# Patient Record
Sex: MALE | Race: ASIAN | NOT HISPANIC OR LATINO | Employment: FULL TIME | URBAN - METROPOLITAN AREA
[De-identification: names, ages, dates, MRNs, and addresses within clinical notes are randomized per-mention and may not be internally consistent; named-entity substitution may affect disease eponyms.]

---

## 2024-05-21 ENCOUNTER — OFFICE VISIT (OUTPATIENT)
Dept: ORTHOPEDICS | Facility: CLINIC | Age: 44
End: 2024-05-21
Payer: COMMERCIAL

## 2024-05-21 VITALS
DIASTOLIC BLOOD PRESSURE: 72 MMHG | HEIGHT: 66 IN | RESPIRATION RATE: 18 BRPM | WEIGHT: 200 LBS | BODY MASS INDEX: 32.14 KG/M2 | SYSTOLIC BLOOD PRESSURE: 124 MMHG

## 2024-05-21 DIAGNOSIS — M77.11 RIGHT LATERAL EPICONDYLITIS: Primary | ICD-10-CM

## 2024-05-21 PROCEDURE — 1160F RVW MEDS BY RX/DR IN RCRD: CPT | Mod: CPTII,S$GLB,, | Performed by: ORTHOPAEDIC SURGERY

## 2024-05-21 PROCEDURE — 3078F DIAST BP <80 MM HG: CPT | Mod: CPTII,S$GLB,, | Performed by: ORTHOPAEDIC SURGERY

## 2024-05-21 PROCEDURE — 3074F SYST BP LT 130 MM HG: CPT | Mod: CPTII,S$GLB,, | Performed by: ORTHOPAEDIC SURGERY

## 2024-05-21 PROCEDURE — 1159F MED LIST DOCD IN RCRD: CPT | Mod: CPTII,S$GLB,, | Performed by: ORTHOPAEDIC SURGERY

## 2024-05-21 PROCEDURE — 3008F BODY MASS INDEX DOCD: CPT | Mod: CPTII,S$GLB,, | Performed by: ORTHOPAEDIC SURGERY

## 2024-05-21 PROCEDURE — 99203 OFFICE O/P NEW LOW 30 MIN: CPT | Mod: 25,S$GLB,, | Performed by: ORTHOPAEDIC SURGERY

## 2024-05-21 PROCEDURE — 20550 NJX 1 TENDON SHEATH/LIGAMENT: CPT | Mod: RT,S$GLB,, | Performed by: ORTHOPAEDIC SURGERY

## 2024-05-21 RX ORDER — TRIAMCINOLONE ACETONIDE 40 MG/ML
40 INJECTION, SUSPENSION INTRA-ARTICULAR; INTRAMUSCULAR
Status: DISCONTINUED | OUTPATIENT
Start: 2024-05-21 | End: 2024-05-21 | Stop reason: HOSPADM

## 2024-05-21 RX ADMIN — TRIAMCINOLONE ACETONIDE 40 MG: 40 INJECTION, SUSPENSION INTRA-ARTICULAR; INTRAMUSCULAR at 08:05

## 2024-05-21 NOTE — PROGRESS NOTES
[facility]    Subjective:     Chief Complaint:   Chief Complaint   Patient presents with    Right Elbow - Pain     Right elbow pain for over one month. Notes pain with movement range of motion. Weakness and unable to lift heavier items notes pulsing pain with gripping or squeezing.       History reviewed. No pertinent past medical history.    History reviewed. No pertinent surgical history.    No current outpatient medications on file.     No current facility-administered medications for this visit.       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  He has had right elbow pain for several months he thinks it started using some sort of tool that vibrates a lot.  The pain is worse when he uses the hand.  Denies any other trauma.      Review of Systems:    Constitution: Negative for chills, fever, and sweats.  Negative for unexplained weight loss.    HENT:  Negative for headaches and blurry vision.    Cardiovascular:Negative for chest pain or irregular heart beat. Negative for hypertension.    Respiratory:  Negative for cough and shortness of breath.    Gastrointestinal: Negative for abdominal pain, heartburn, melena, nausea, and vomitting.    Genitourinary:  Negative bladder incontinence and dysuria.    Musculoskeletal:  See HPI for details.     Neurological: Negative for numbness.    Psychiatric/Behavioral: Negative for  depression.  The patient is not nervous/anxious.      Endocrine: Negative for polyuria    Hematologic/Lymphatic: Negative for bleeding problem.  Does not bruise/bleed easily.    Skin: Negative for poor would healing and rash    Objective:      Physical Examination:    Vital Signs:    Vitals:    05/21/24 0831   BP: 124/72   Resp: 18       Body mass index is 32.28 kg/m².    This a well-developed, well nourished patient in no acute distress.  They are alert and oriented and cooperative to examination.        Patient is very tender over the lateral epicondylar region pain with extension of the wrist and fingers no pain with flexion of the wrist and fingers no pain with flexion of the contralateral elbow no pain with extension of the contralateral elbow  Pertinent New Results:    XRAY Report / Interpretation:   AP and lateral of the right elbow demonstrates normal bony anatomy    Assessment/Plan:      Lateral epicondylitis I injected the lateral epicondylar region with steroid and local.  I started him on physical therapy follow-up in 6 weeks      This note was created using Dragon voice recognition software that occasionally misinterpreted phrases or words.

## 2024-05-22 PROBLEM — M25.521 RIGHT ELBOW PAIN: Status: ACTIVE | Noted: 2024-05-22

## 2024-05-22 NOTE — PROGRESS NOTES
"Patient evaluated and plan of care established.  Please sign and return if in agreement.    Thank you,  OSWALDO Bliss         Occupational Therapy [facility]  Initial Evaluation     Date: 5/23/2024  Name: Mamadou Weiss  [facility] Number: [id number]    Therapy Diagnosis: M77.11 (ICD-10-CM) - Right lateral epicondylitis   Encounter Diagnosis   Name Primary?    Right elbow pain Yes     Physician: Luis Enrique Huston MD    Physician Orders: M77.11 (ICD-10-CM) - Right lateral epicondylitis ; evaluate and treat  Surgical Procedure and Date: Not Applicable   Dominant Side: Right  Date of Onset: several months  History / Mechanism of Injury: Patient went to doctor with (Right) elbow pain on 05/21/2024.  Patient received an injection in (Right) elbow and was referred to therapy for evaluation and treatment.   Previous Therapy:  none    Environmental Concerns/Fall Risk: None  Language: None  Cultural/Spiritual: None  Abuse/Neglect/Nutritional: None  Imaging / Tests: See Epic    Past Medical History/Physical Systems Review:    has no past medical history on file.     has no past surgical history on file.    currently has no medications in their medication list.    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 Reports, "no known Cancer, no pacemaker, no allergies to latex or adhesives."      Subjective     Patient Reports, "I went to the doctor on 05/21/2024 with (Right) elbow pain.  He injected my elbow and  " "referred me to therapy for evaluation and treatment.  I have been having pain for several months and I think I hurt my elbow when I was using a tool with a lot of vibration. I got an injection when I went to the doctor and it did not really help with the pain.   I am having difficulty carrying, lifting items, I usually workout and do pushups but haven't been working out."     Pain   At rest: 0/10  With work/ Activity: 7/10  Sleepin/10  Location of Pain: (Right) elbow    Patient's Goals for Therapy: decrease pain    Objective     Sensation: grossly intact  Scar / Wound: Not Applicable     Active Range of Motion: hand  Patient able to actively make composite fist (Bilateral) fists and oppose (Bilateral) thumbs to palm     Active Range of Motion: elbow/wrist                         (Right)   //  (Left)  Elbow Extension/Flexion: 0 / 127  /// 0/130  Dorsal Flexion /Volar Flexion: 65 / 75  //  65/65   Radial Deviation /Ulnar Deviation: 20 / 30  // 20/35  Supination / Pronation: 85 / 85  // 85/85    Passive Range of Motion: elbow/wrist                         (Right)     Elbow Extension/Flexion:  WFL   Dorsal Flexion /Volar Flexion: WFL   Radial Deviation /Ulnar Deviation:  WFL   Supination / Pronation:  WFL       Manual Muscle Test:  Elbow / Wrist          (Right)       Elbow Flexion:      4/5  Elbow Extension:  4/5  Dorsal Flexion: 3+/5 p!  Volar Flexion: 4/5  Radial Deviation: 5/5  Ulnar Deviation: 5/5       Strength: (KARLIE Dynamometer in pounds),Position II  Elbow Flexion:  (Right):  60  (Left): 110    Elbow Extension:  (Right):  25  (Left): 105       Pinch Strength: (Pinch Gauge in pounds)             (Right) /  (Left)  Lateral Pinch:  17  24  3 Point Pinch: 14  20  2 Point Pinch:  / 19        FOTO SCORE: 50%      Treatment Included:   Occupational Therapy  evaluation and instruction in written Home Exercise Program including  Elbow Extension and Dorsal Flexion / Volar Flexion stretches x 5 second holds x " 5 repetitions x 3 x daily.    Patient. Educated on modalities for home pain management, activity modifications and precautions, Circumferential elbow strap for use during activity with take press off of extensor tendon;  Multidirectional soft tissue massage to lateral aspect of (Right) elbow to increase blood flow for healing.   Patient. Demo understanding of above.     Patient/Family Education: role of Occupational Therapy, goals for Occupational Therapy, scheduling/cancellations - patient verbalized understanding. Discussed insurance limitations with patient.        Assessment:     Problem List :       Decreased  and pinch strength,   Decreased muscle strength,   Decreased functional abilities,  Increased pain    During the evaluation, the patient required Minimal modification or assistance.  The following co-morbid conditions/personal factors may affect the plan of care: none.        Mamadou Weiss is a 43 y.o. male referred to outpatient occupational therapy and presents with a medical diagnosis of M77.11 (ICD-10-CM) - Right lateral epicondylitis , resulting in limited range of motion, strength, functional abilities, increased pain and inflammation and demonstrates limitations as described in the chart above. Following medical record review it is determined that patient will benefit from occupational therapy services in order to maximize pain free and/or functional use of right elbow. The following goals were discussed with the patient and patient is in agreement with them as to be addressed in the treatment plan. The patient's rehab potential is Good.     Anticipated Barriers to Therapy: None     The following goals were discussed with the patient and patient is in agreement with them as to be addressed in the treatment plan.     Goals:   Goals:  1)   Patient to be Independent with Home exercise program and modalities for pain management by 1 week.   2)   Patient will report   5/10 pain on average with  activity to assist with exercises by 6-8 weeks.  3)   Patient will increase manual muscle testing by a grade to assist with lifting items by 6-8 weeks.   4)   Patient will increase  strength 3-5 pounds to open containers by 6-8 weeks.  5)   Patient will increase pinch by 1-3 pounds for buttoning by 6-8 weeks.        Plan:   Patient to be treated by Occupational Therapy    2    times per week for     90 days   during the certification period from   05/23/2024  to 08/23/2024     to achieve the established goals.  Treatment to include :  paraffin, fluidotherapy, manual therapy/joint mobilizations, kinesiotaping, dry needling performed by certified physical therapist,   modalities for pain management, Ultrasound 1.0 /3.0mhz, therapeutic exercises/activities,        strengthening,    as well as any other treatments deemed necessary based on the patient's needs or progress.     Will reassess as needed and adjust treatment plan accordingly.              I certify the need for these services furnished under this plan of treatment and while under my care    ____________________________________                         __________________  Physician/Referring Practitioner                                               Date of Signature    Rachel Wyman OT

## 2024-05-23 ENCOUNTER — CLINICAL SUPPORT (OUTPATIENT)
Dept: REHABILITATION | Facility: HOSPITAL | Age: 44
End: 2024-05-23
Payer: COMMERCIAL

## 2024-05-23 DIAGNOSIS — M77.11 RIGHT LATERAL EPICONDYLITIS: ICD-10-CM

## 2024-05-23 DIAGNOSIS — M25.521 RIGHT ELBOW PAIN: Primary | ICD-10-CM

## 2024-05-23 PROCEDURE — 97530 THERAPEUTIC ACTIVITIES: CPT | Mod: PN

## 2024-05-23 PROCEDURE — 97165 OT EVAL LOW COMPLEX 30 MIN: CPT | Mod: PN

## 2024-05-23 NOTE — PLAN OF CARE
"Patient evaluated and plan of care established.  Please sign and return if in agreement.    Thank you,  OSWALDO Bliss         Occupational Therapy [facility]  Initial Evaluation     Date: 5/23/2024  Name: Mamadou Weiss  [facility] Number: [id number]    Therapy Diagnosis: M77.11 (ICD-10-CM) - Right lateral epicondylitis   Encounter Diagnosis   Name Primary?    Right elbow pain Yes     Physician: Luis Enrique Huston MD    Physician Orders: M77.11 (ICD-10-CM) - Right lateral epicondylitis ; evaluate and treat  Surgical Procedure and Date: Not Applicable   Dominant Side: Right  Date of Onset: several months  History / Mechanism of Injury: Patient went to doctor with (Right) elbow pain on 05/21/2024.  Patient received an injection in (Right) elbow and was referred to therapy for evaluation and treatment.   Previous Therapy:  none    Environmental Concerns/Fall Risk: None  Language: None  Cultural/Spiritual: None  Abuse/Neglect/Nutritional: None  Imaging / Tests: See Epic    Past Medical History/Physical Systems Review:    has no past medical history on file.     has no past surgical history on file.    currently has no medications in their medication list.    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 Reports, "no known Cancer, no pacemaker, no allergies to latex or adhesives."      Subjective     Patient Reports, "I went to the doctor on 05/21/2024 with (Right) elbow pain.  He injected my elbow and  " "referred me to therapy for evaluation and treatment.  I have been having pain for several months and I think I hurt my elbow when I was using a tool with a lot of vibration. I got an injection when I went to the doctor and it did not really help with the pain.   I am having difficulty carrying, lifting items, I usually workout and do pushups but haven't been working out."     Pain   At rest: 0/10  With work/ Activity: 7/10  Sleepin/10  Location of Pain: (Right) elbow    Patient's Goals for Therapy: decrease pain    Objective     Sensation: grossly intact  Scar / Wound: Not Applicable     Active Range of Motion: hand  Patient able to actively make composite fist (Bilateral) fists and oppose (Bilateral) thumbs to palm     Active Range of Motion: elbow/wrist                         (Right)   //  (Left)  Elbow Extension/Flexion: 0 / 127  /// 0/130  Dorsal Flexion /Volar Flexion: 65 / 75  //  65/65   Radial Deviation /Ulnar Deviation: 20 / 30  // 20/35  Supination / Pronation: 85 / 85  // 85/85    Passive Range of Motion: elbow/wrist                         (Right)     Elbow Extension/Flexion:  WFL   Dorsal Flexion /Volar Flexion: WFL   Radial Deviation /Ulnar Deviation:  WFL   Supination / Pronation:  WFL       Manual Muscle Test:  Elbow / Wrist          (Right)       Elbow Flexion:      4/5  Elbow Extension:  4/5  Dorsal Flexion: 3+/5 p!  Volar Flexion: 4/5  Radial Deviation: 5/5  Ulnar Deviation: 5/5       Strength: (KARLIE Dynamometer in pounds),Position II  Elbow Flexion:  (Right):  60  (Left): 110    Elbow Extension:  (Right):  25  (Left): 105       Pinch Strength: (Pinch Gauge in pounds)             (Right) /  (Left)  Lateral Pinch:  17  24  3 Point Pinch: 14  20  2 Point Pinch:  / 19        FOTO SCORE: 50%      Treatment Included:   Occupational Therapy  evaluation and instruction in written Home Exercise Program including  Elbow Extension and Dorsal Flexion / Volar Flexion stretches x 5 second holds x " 5 repetitions x 3 x daily.    Patient. Educated on modalities for home pain management, activity modifications and precautions, Circumferential elbow strap for use during activity with take press off of extensor tendon;  Multidirectional soft tissue massage to lateral aspect of (Right) elbow to increase blood flow for healing.   Patient. Demo understanding of above.     Patient/Family Education: role of Occupational Therapy, goals for Occupational Therapy, scheduling/cancellations - patient verbalized understanding. Discussed insurance limitations with patient.        Assessment:     Problem List :       Decreased  and pinch strength,   Decreased muscle strength,   Decreased functional abilities,  Increased pain    During the evaluation, the patient required Minimal modification or assistance.  The following co-morbid conditions/personal factors may affect the plan of care: none.        Mamadou Weiss is a 43 y.o. male referred to outpatient occupational therapy and presents with a medical diagnosis of M77.11 (ICD-10-CM) - Right lateral epicondylitis , resulting in limited range of motion, strength, functional abilities, increased pain and inflammation and demonstrates limitations as described in the chart above. Following medical record review it is determined that patient will benefit from occupational therapy services in order to maximize pain free and/or functional use of right elbow. The following goals were discussed with the patient and patient is in agreement with them as to be addressed in the treatment plan. The patient's rehab potential is Good.     Anticipated Barriers to Therapy: None     The following goals were discussed with the patient and patient is in agreement with them as to be addressed in the treatment plan.     Goals:   Goals:  1)   Patient to be Independent with Home exercise program and modalities for pain management by 1 week.   2)   Patient will report   5/10 pain on average with  activity to assist with exercises by 6-8 weeks.  3)   Patient will increase manual muscle testing by a grade to assist with lifting items by 6-8 weeks.   4)   Patient will increase  strength 3-5 pounds to open containers by 6-8 weeks.  5)   Patient will increase pinch by 1-3 pounds for buttoning by 6-8 weeks.        Plan:   Patient to be treated by Occupational Therapy    2    times per week for     90 days   during the certification period from   05/23/2024  to 08/23/2024     to achieve the established goals.  Treatment to include :  paraffin, fluidotherapy, manual therapy/joint mobilizations, kinesiotaping, dry needling performed by certified physical therapist,   modalities for pain management, Ultrasound 1.0 /3.0mhz, therapeutic exercises/activities,        strengthening,    as well as any other treatments deemed necessary based on the patient's needs or progress.     Will reassess as needed and adjust treatment plan accordingly.              I certify the need for these services furnished under this plan of treatment and while under my care    ____________________________________                         __________________  Physician/Referring Practitioner                                               Date of Signature    Rachel Wyman OT

## 2024-05-23 NOTE — PROGRESS NOTES
"                                  Occupational Therapy Daily Treatment Note       Date: 5/30/2024  Name: Mamadou Weiss  [facility] Number: [id number]    Therapy Diagnosis: M77.11 (ICD-10-CM) - Right lateral epicondylitis  Encounter Diagnosis   Name Primary?    Right elbow pain Yes     Physician: Luis Enrique Huston MD    Physician Orders: M77.11 (ICD-10-CM) - Right lateral epicondylitis; evaluate and treat  Medical Diagnosis: M77.11 (ICD-10-CM) - Right lateral epicondylitis  Surgical Procedure and Date: Not Applicable     Insurance Authorization Period Expiration: 05/01/2024-12/31/2024  Plan of Care Certification Period: 05/23/2024-08/23/2024  Date of Return to MD: 07/02/2024    Evaluation FOTO: 05/23/2024 = 50%    Visit # / Visits authorized: 1 / 20   Time In: 8:30  Time Out: 9:10   Total Billable Time:  40 minutes    Precautions:  Standard            Subjective     Patient reports: "I got the elbow strap and I think my pain in my elbow is getting better.  I still have pain when I lift up something heavy like the trash, and when I go to move something of weight."     Pain: 4-6/10   Location of pain: (Right) elbow     Objective    Patient seen by Occupational Therapy this session. Tx consisted of:      Direct contact modalities after being cleared for contraindications x    8 minutes:  -Ultrasound applied to   lateral aspect of (Right) elbow region  on   1.0 Mhz with 1.2 w/cm2 @ 50 % duty cycle to decrease pain and inflammation  /////  to and increase tissue elasticity x 8 minutes.        Therapeutic  Exercises to improve functional performance while increasing strength, endurance, range of motion,  and flexibility  x     8 minutes:    -Soft Tissue Massage to lateral aspect of (Right) elbow region with SILVER tool increase blood flow for healing and functional abilities  x  4  minutes    - Wrist wheel for Supination / Pronation stretch x 10 repetitions    - Wrist roller coaster for Active Range of Motion  of wrist in all planes " "x 10 repetitions    - Weighted ball on tabletop for Dorsiflexion /Volar Flexion and elbow Extension / FLEXION  stretches x 10 repetitions    Therapeutic Activities  to improve functional performance while increasing independence with ADLs & IADLs  x     24 minutes:    - 1# UBE in CW/CCW motion x 6 minutes to increase endurance, range of motion, and strength  - 35# Progressive Hand Gripper (black spring) for composite  (elbow FLEXION) x 20 repetitions  - RED digiflex for isolated x 10 repetitions;  GREEN digiflex for composite digital flexion x 20 repetitions    - Wooden pegboard with RED clothespins with 3PT pinch x 2 repetitions   - RED Theraputty with PVC for "cookie cutting" and medicine top x 10 Repetitions   - YELLOW Flexbar for Supination /Pronation  and Dorsiflexion /Volar Flexion x 10 repetitions     Initiate in future therapy sessions:      - ### Theraputty for  pulling  -  HAMMER for Supination /Pronation stretch x 10 repetitions   - YELLOW digiweb for composite digital Extension and  intrinsics x 20 repetitions   - ### Wrist exerstick in standing for Dorsiflexion / Volar Flexion  x 3 repetitions            Assessment     Patient will continue to benefit from skilled Occupational Therapy intervention to increase functional abilities, range of motion, and strength and pain control.  Patient. demonstrated proper understanding of each exercise.  Patient continues to require verbal and tactile cues for throughout therapy session to maintain position and prevent compensation.   Patient continues to be limited in functional and leisurely pursuits. Pain limits patient's participation in activities of daily living.  Patient is not able to carryout necessary vocational tasks.   Patient's spiritual, cultural and educational needs considered and patient agreeable to plan of care and goals.  Patient is making good progress towards established goals.  Patient tolerated exercises / activities within pain tolerance.  " " Reviewed Home Exercise Program with patient., see EPIC under "patient instructions" for provided exercises, activity modifications and limitations, modalities for home pain management.  Patient. demo understanding of above.    Patient. tolerated Ultrasound  with no irritation.     Patient reported slight discomfort with Flexbar but able to complete activity with in pain tolerance.      New/Revised Goals: Continue Plan Of Care   Goals:  1)   Patient to be Independent with Home exercise program and modalities for pain management by 1 week. (MET)   2)   Patient will report   5/10 pain on average with activity to assist with exercises by 6-8 weeks.( In Progress)   3)   Patient will increase manual muscle testing by a grade to assist with lifting items by 6-8 weeks. ( In Progress)   4)   Patient will increase  strength 3-5 pounds to open containers by 6-8 weeks.( In Progress)   5)   Patient will increase pinch by 1-3 pounds for buttoning by 6-8 weeks.  ( In Progress)     Plan      Continue 2x week during the 90 day certification period   05/23/2024   to 08/23/2024   with established Plan Of Care in pursuit of Occupational Therapy goals.      Rachel Wyman OT      "

## 2024-05-30 ENCOUNTER — CLINICAL SUPPORT (OUTPATIENT)
Dept: REHABILITATION | Facility: HOSPITAL | Age: 44
End: 2024-05-30
Payer: COMMERCIAL

## 2024-05-30 DIAGNOSIS — M25.521 RIGHT ELBOW PAIN: Primary | ICD-10-CM

## 2024-05-30 PROCEDURE — 97110 THERAPEUTIC EXERCISES: CPT | Mod: PN

## 2024-05-30 PROCEDURE — 97035 APP MDLTY 1+ULTRASOUND EA 15: CPT | Mod: PN

## 2024-05-30 PROCEDURE — 97530 THERAPEUTIC ACTIVITIES: CPT | Mod: PN

## 2024-05-30 NOTE — PROGRESS NOTES
"                                  Occupational Therapy Daily Treatment Note       Date: 6/6/2024  Name: Mamadou Weiss  [facility] Number: [id number]    Therapy Diagnosis: M77.11 (ICD-10-CM) - Right lateral epicondylitis  Encounter Diagnosis   Name Primary?    Right elbow pain Yes     Physician: Luis Enrique Huston MD    Physician Orders: M77.11 (ICD-10-CM) - Right lateral epicondylitis; evaluate and treat  Medical Diagnosis: M77.11 (ICD-10-CM) - Right lateral epicondylitis  Surgical Procedure and Date: Not Applicable     Insurance Authorization Period Expiration: 05/01/2024-12/31/2024  Plan of Care Certification Period: 05/23/2024-08/23/2024  Date of Return to MD: 07/02/2024    Evaluation FOTO: 05/23/2024 = 50%    Visit # / Visits authorized: 2 / 20   Time In: 3:00  Time Out: 3:45   Total Billable Time:  40 minutes    Precautions:  Standard            Subjective     Patient reports: "I can still feel the pain in my elbow.  I got some Biofreeze and it has been helping.  The pain in my elbow is like a stabbing pain."     Pain: 2/10   Location of pain: (Right) elbow     Objective    Patient seen by Occupational Therapy this session. Tx consisted of:      Direct contact modalities after being cleared for contraindications x    8 minutes:  -Ultrasound applied to   lateral aspect of (Right) elbow region  on   1.0 Mhz with 1.2 w/cm2 @ 50 % duty cycle to decrease pain and inflammation  /////  to and increase tissue elasticity x 8 minutes.        Therapeutic  Exercises to improve functional performance while increasing strength, endurance, range of motion,  and flexibility  x     8 minutes:    -Soft Tissue Massage to lateral aspect of (Right) elbow region with SILVER tool increase blood flow for healing and functional abilities  x  4  minutes    - Wrist wheel for Supination / Pronation stretch x 10 repetitions    - Wrist roller coaster for Active Range of Motion  of wrist in all planes x 10 repetitions    - Weighted ball on tabletop for " "Dorsiflexion /Volar Flexion and elbow Extension / FLEXION  stretches x 10 repetitions  -  HAMMER for Supination /Pronation stretch x 10 repetitions       Therapeutic Activities  to improve functional performance while increasing independence with ADLs & IADLs  x     24 minutes:    - 1# UBE in CW/CCW motion x 6 minutes to increase endurance, range of motion, and strength  - 35# Progressive Hand Gripper (black spring) for composite  (elbow FLEXION) x 20 repetitions  - RED digiflex for isolated x 10 repetitions;  GREEN digiflex for composite digital flexion x 20 repetitions    - Wooden pegboard with RED clothespins with 3PT pinch x 2 repetitions   - RED Theraputty with PVC for "cookie cutting" and medicine top x 10 Repetitions   - YELLOW Flexbar for Supination /Pronation  and Dorsiflexion /Volar Flexion x 10 repetitions     Initiate in future therapy sessions:      - ### Theraputty for  pulling   - YELLOW digiweb for composite digital Extension and  intrinsics x 20 repetitions   - ### Wrist exerstick in standing for Dorsiflexion / Volar Flexion  x 3 repetitions            Assessment     Patient will continue to benefit from skilled Occupational Therapy intervention to increase functional abilities, range of motion, and strength and pain control.  Patient. demonstrated proper understanding of each exercise.  Patient continues to require verbal and tactile cues for throughout therapy session to maintain position and prevent compensation.   Patient continues to be limited in functional and leisurely pursuits. Pain limits patient's participation in activities of daily living.  Patient is not able to carryout necessary vocational tasks.   Patient's spiritual, cultural and educational needs considered and patient agreeable to plan of care and goals.  Patient is making good progress towards established goals.  Patient tolerated exercises / activities within pain tolerance.   Reviewed Home Exercise Program with patient., " "see EPIC under "patient instructions" for provided exercises, activity modifications and limitations, modalities for home pain management.  Patient. demo understanding of above.    Patient. tolerated Ultrasound  with no irritation.     Patient tolerated increase in resistance during ergometer with no reported pain.  Patient tolerated addition of  HAMMER for Supination / Pronation stretch with tolerable stretching pain reported.      New/Revised Goals: Continue Plan Of Care   Goals:  1)   Patient to be Independent with Home exercise program and modalities for pain management by 1 week. (MET)   2)   Patient will report   5/10 pain on average with activity to assist with exercises by 6-8 weeks.( In Progress)   3)   Patient will increase manual muscle testing by a grade to assist with lifting items by 6-8 weeks. ( In Progress)   4)   Patient will increase  strength 3-5 pounds to open containers by 6-8 weeks.( In Progress)   5)   Patient will increase pinch by 1-3 pounds for buttoning by 6-8 weeks.  ( In Progress)     Plan      Continue 2x week during the 90 day certification period   05/23/2024   to 08/23/2024   with established Plan Of Care in pursuit of Occupational Therapy goals.      Rachel Wyman OT        "

## 2024-06-06 ENCOUNTER — CLINICAL SUPPORT (OUTPATIENT)
Dept: REHABILITATION | Facility: HOSPITAL | Age: 44
End: 2024-06-06
Payer: COMMERCIAL

## 2024-06-06 DIAGNOSIS — M25.521 RIGHT ELBOW PAIN: Primary | ICD-10-CM

## 2024-06-06 PROCEDURE — 97530 THERAPEUTIC ACTIVITIES: CPT | Mod: PN

## 2024-06-06 PROCEDURE — 97035 APP MDLTY 1+ULTRASOUND EA 15: CPT | Mod: PN

## 2024-06-06 PROCEDURE — 97110 THERAPEUTIC EXERCISES: CPT | Mod: PN

## 2024-06-06 NOTE — PROGRESS NOTES
"                                  Occupational Therapy Daily Treatment Note       Date: 6/12/2024  Name: Mamadou Weiss  [facility] Number: [id number]    Therapy Diagnosis: M77.11 (ICD-10-CM) - Right lateral epicondylitis  Encounter Diagnosis   Name Primary?    Right elbow pain Yes     Physician: Luis Enrique Huston MD    Physician Orders: M77.11 (ICD-10-CM) - Right lateral epicondylitis; evaluate and treat  Medical Diagnosis: M77.11 (ICD-10-CM) - Right lateral epicondylitis  Surgical Procedure and Date: Not Applicable     Insurance Authorization Period Expiration: 05/01/2024-12/31/2024  Plan of Care Certification Period: 05/23/2024-08/23/2024  Date of Return to MD: 07/02/2024    Evaluation FOTO: 05/23/2024 = 50%    Visit # / Visits authorized: 3 / 20   Time In: 8:10   Time Out: 8:55   Total Billable Time:  40 minutes    Precautions:  Standard            Subjective     Patient reports: "I am having a little pain in my arm today.  I feel pain when I straighten out my elbow and when I carry something.  The pain is sharp"      Pain: 1-2/10   Location of pain: (Right) elbow     Objective    Patient seen by Occupational Therapy this session. Tx consisted of:      Direct contact modalities after being cleared for contraindications x    8 minutes:  -Ultrasound applied to   lateral aspect of (Right) elbow region  on   1.0 Mhz with 1.2 w/cm2 @ 50 % duty cycle to decrease pain and inflammation  /////  to and increase tissue elasticity x 8 minutes.        Therapeutic  Exercises to improve functional performance while increasing strength, endurance, range of motion,  and flexibility  x     8 minutes:    -Soft Tissue Massage to lateral aspect of (Right) elbow region with SILVER tool increase blood flow for healing and functional abilities  x  4  minutes    - Wrist wheel with YELLOW Theraband for Supination / Pronation  x 10 repetitions    - Wrist roller coaster for Active Range of Motion  of wrist in all planes x 10 repetitions    - Weighted " "ball on tabletop for Dorsiflexion /Volar Flexion and elbow Extension / FLEXION  stretches x 10 repetitions -NP  - 1# dowel for Supination /Pronation stretch x 10 repetitions     - YELLOW digiweb for composite digital Extension x 10 repetitions   - YELLOW digiweb for  intrinsics x 20 repetitions       Therapeutic Activities  to improve functional performance while increasing independence with ADLs & IADLs  x     24 minutes:    - 1.5# UBE in CW/CCW motion x 6 minutes to increase endurance, range of motion, and strength  - 35# Progressive Hand Gripper (black spring) for composite  (elbow FLEXION) x 20 repetitions  - RED digiflex for isolated x 10 repetitions;  GREEN digiflex for composite digital flexion x 20 repetitions    - Wooden pegboard with GREEN clothespins with 3PT pinch x 2 repetitions   - RED Theraputty with PVC for "cookie cutting" and medicine top x 10 Repetitions   - YELLOW Flexbar for Supination /Pronation  and Dorsiflexion /Volar Flexion x 10 repetitions     -NP = Not Performed     Initiate in future therapy sessions:      - ### Theraputty for  pulling   - ### Wrist exerstick in standing for Dorsiflexion / Volar Flexion  x 3 repetitions            Assessment     Patient will continue to benefit from skilled Occupational Therapy intervention to increase functional abilities, range of motion, and strength and pain control.  Patient. demonstrated proper understanding of each exercise.  Patient continues to require verbal and tactile cues for throughout therapy session to maintain position and prevent compensation.   Patient continues to be limited in functional and leisurely pursuits. Pain limits patient's participation in activities of daily living.  Patient is not able to carryout necessary vocational tasks.   Patient's spiritual, cultural and educational needs considered and patient agreeable to plan of care and goals.  Patient is making good progress towards established goals.  Patient tolerated " "exercises / activities within pain tolerance.   Reviewed Home Exercise Program with patient., see EPIC under "patient instructions" for provided exercises, activity modifications and limitations, modalities for home pain management.  Patient. demo understanding of above.    Patient. tolerated Ultrasound  with no irritation.     Patient tolerated increase in resistance during ergometer  and wooden pegboard with clothespins for 3 Point Pinch with no reported pain.  Patient tolerated addition of  dowel with leg weight with tolerable stretching pain reported.  Patient tolerated addition of digiweb for intrinsics and composite digital Extension with no reported pain.  Patient tolerated addition of Theraband with wrist wheel for Supination  / Pronation with no reported pain but demo fatigue.      New/Revised Goals: Continue Plan Of Care   Goals:  1)   Patient to be Independent with Home exercise program and modalities for pain management by 1 week. (MET)   2)   Patient will report   5/10 pain on average with activity to assist with exercises by 6-8 weeks.( In Progress)   3)   Patient will increase manual muscle testing by a grade to assist with lifting items by 6-8 weeks. ( In Progress)   4)   Patient will increase  strength 3-5 pounds to open containers by 6-8 weeks.( In Progress)   5)   Patient will increase pinch by 1-3 pounds for buttoning by 6-8 weeks.  ( In Progress)     Plan      Continue 2x week during the 90 day certification period   05/23/2024   to 08/23/2024   with established Plan Of Care in pursuit of Occupational Therapy goals.      Rachel Wyman, OT          "

## 2024-06-12 ENCOUNTER — CLINICAL SUPPORT (OUTPATIENT)
Dept: REHABILITATION | Facility: HOSPITAL | Age: 44
End: 2024-06-12
Payer: COMMERCIAL

## 2024-06-12 DIAGNOSIS — M25.521 RIGHT ELBOW PAIN: Primary | ICD-10-CM

## 2024-06-12 PROCEDURE — 97035 APP MDLTY 1+ULTRASOUND EA 15: CPT | Mod: PN

## 2024-06-12 PROCEDURE — 97110 THERAPEUTIC EXERCISES: CPT | Mod: PN

## 2024-06-12 PROCEDURE — 97530 THERAPEUTIC ACTIVITIES: CPT | Mod: PN

## 2024-06-12 NOTE — PROGRESS NOTES
"                                  Occupational Therapy Daily Treatment Note       Date: 6/20/2024  Name: Mamadou Weiss  [facility] Number: [id number]    Therapy Diagnosis: M77.11 (ICD-10-CM) - Right lateral epicondylitis  Encounter Diagnosis   Name Primary?    Right elbow pain Yes     Physician: Luis Enrique Huston MD    Physician Orders: M77.11 (ICD-10-CM) - Right lateral epicondylitis; evaluate and treat  Medical Diagnosis: M77.11 (ICD-10-CM) - Right lateral epicondylitis  Surgical Procedure and Date: Not Applicable     Insurance Authorization Period Expiration: 05/01/2024-12/31/2024  Plan of Care Certification Period: 05/23/2024-08/23/2024  Date of Return to MD: 07/02/2024    Evaluation FOTO: 05/23/2024 = 50%    Visit # / Visits authorized: 4 / 20   Time In: 3:40  Time Out: 4:20   Total Billable Time:  40 minutes    Precautions:  Standard            Subjective     Patient reports: "I am not having the pain that I did before.  I can carry items with no problem."     Pain: 1-2/10   Location of pain: (Right) elbow     Objective    Patient seen by Occupational Therapy this session. Tx consisted of:      Direct contact modalities after being cleared for contraindications x    8 minutes:  -Ultrasound applied to   lateral aspect of (Right) elbow region  on   1.0 Mhz with 1.2 w/cm2 @ 50 % duty cycle to decrease pain and inflammation  /////  to and increase tissue elasticity x 8 minutes.        Therapeutic  Exercises to improve functional performance while increasing strength, endurance, range of motion,  and flexibility  x     8 minutes:    -Soft Tissue Massage to lateral aspect of (Right) elbow region with SILVER tool increase blood flow for healing and functional abilities  x  4  minutes    - Wrist wheel with RED Theraband for Supination / Pronation  x 10 repetitions    - Wrist roller coaster for Active Range of Motion  of wrist in all planes x 10 repetitions    - Weighted ball on tabletop for Dorsiflexion /Volar Flexion and elbow " "Extension / FLEXION  stretches x 10 repetitions -NP  - 1# dowel for Supination /Pronation stretch x 10 repetitions     - YELLOW digiweb for composite digital Extension x 10 repetitions   - RED digiweb for  intrinsics x 20 repetitions       Therapeutic Activities  to improve functional performance while increasing independence with ADLs & IADLs  x     24 minutes:    - 1.5# UBE in CW/CCW motion x 6 minutes to increase endurance, range of motion, and strength  - 35# Progressive Hand Gripper (black spring) for composite  (elbow FLEXION) x 20 repetitions  - RED digiflex for isolated x 10 repetitions;  GREEN digiflex for composite digital flexion x 20 repetitions    - Wooden pegboard with GREEN clothespins with 3PT pinch x 2 repetitions   - RED Theraputty with PVC for "cookie cutting" and medicine top x 10 Repetitions   - RED Flexbar for Supination /Pronation  and Dorsiflexion /Volar Flexion x 10 repetitions     -NP = Not Performed     Initiate in future therapy sessions:      - ### Theraputty for  pulling   - ### Wrist exerstick in standing for Dorsiflexion / Volar Flexion  x 3 repetitions            Assessment     Patient will continue to benefit from skilled Occupational Therapy intervention to increase functional abilities, range of motion, and strength and pain control.  Patient. demonstrated proper understanding of each exercise.  Patient continues to require verbal and tactile cues for throughout therapy session to maintain position and prevent compensation.   Patient continues to be limited in functional and leisurely pursuits. Pain limits patient's participation in activities of daily living.  Patient is not able to carryout necessary vocational tasks.   Patient's spiritual, cultural and educational needs considered and patient agreeable to plan of care and goals.  Patient is making good progress towards established goals.  Patient tolerated exercises / activities within pain tolerance.   Reviewed Home " "Exercise Program with patient., see EPIC under "patient instructions" for provided exercises, activity modifications and limitations, modalities for home pain management.  Patient. demo understanding of above.    Patient. tolerated Ultrasound  with no irritation.     Patient tolerated increase in resistance during digiweb for intrinsics, Theraband during wrist wheel for Supination/ Pronation, Flexbar for Supination /Pronation  and Dorsiflexion /Volar Flexion with no reported pain.       New/Revised Goals: Continue Plan Of Care   Goals:  1)   Patient to be Independent with Home exercise program and modalities for pain management by 1 week. (MET)   2)   Patient will report   5/10 pain on average with activity to assist with exercises by 6-8 weeks.( In Progress)   3)   Patient will increase manual muscle testing by a grade to assist with lifting items by 6-8 weeks. ( In Progress)   4)   Patient will increase  strength 3-5 pounds to open containers by 6-8 weeks.( In Progress)   5)   Patient will increase pinch by 1-3 pounds for buttoning by 6-8 weeks.  ( In Progress)     Plan      Continue 2x week during the 90 day certification period   05/23/2024   to 08/23/2024   with established Plan Of Care in pursuit of Occupational Therapy goals.      Rachel Wyman OT            "

## 2024-06-20 ENCOUNTER — CLINICAL SUPPORT (OUTPATIENT)
Dept: REHABILITATION | Facility: HOSPITAL | Age: 44
End: 2024-06-20
Payer: COMMERCIAL

## 2024-06-20 DIAGNOSIS — M25.521 RIGHT ELBOW PAIN: Primary | ICD-10-CM

## 2024-06-20 PROCEDURE — 97530 THERAPEUTIC ACTIVITIES: CPT | Mod: PN

## 2024-06-20 PROCEDURE — 97035 APP MDLTY 1+ULTRASOUND EA 15: CPT | Mod: PN

## 2024-06-20 PROCEDURE — 97110 THERAPEUTIC EXERCISES: CPT | Mod: PN
